# Patient Record
Sex: FEMALE | Race: WHITE | HISPANIC OR LATINO
[De-identification: names, ages, dates, MRNs, and addresses within clinical notes are randomized per-mention and may not be internally consistent; named-entity substitution may affect disease eponyms.]

---

## 2018-05-15 ENCOUNTER — APPOINTMENT (OUTPATIENT)
Dept: OTOLARYNGOLOGY | Facility: CLINIC | Age: 53
End: 2018-05-15

## 2018-07-02 ENCOUNTER — APPOINTMENT (OUTPATIENT)
Dept: OTOLARYNGOLOGY | Facility: CLINIC | Age: 53
End: 2018-07-02

## 2020-11-02 ENCOUNTER — RESULT REVIEW (OUTPATIENT)
Age: 55
End: 2020-11-02

## 2023-11-16 ENCOUNTER — APPOINTMENT (OUTPATIENT)
Dept: UROGYNECOLOGY | Facility: CLINIC | Age: 58
End: 2023-11-16
Payer: MEDICAID

## 2023-11-16 DIAGNOSIS — Z82.5 FAMILY HISTORY OF ASTHMA AND OTHER CHRONIC LOWER RESPIRATORY DISEASES: ICD-10-CM

## 2023-11-16 DIAGNOSIS — Z83.79 FAMILY HISTORY OF OTHER DISEASES OF THE DIGESTIVE SYSTEM: ICD-10-CM

## 2023-11-16 DIAGNOSIS — Z82.49 FAMILY HISTORY OF ISCHEMIC HEART DISEASE AND OTHER DISEASES OF THE CIRCULATORY SYSTEM: ICD-10-CM

## 2023-11-16 DIAGNOSIS — N39.46 MIXED INCONTINENCE: ICD-10-CM

## 2023-11-16 DIAGNOSIS — Z78.9 OTHER SPECIFIED HEALTH STATUS: ICD-10-CM

## 2023-11-16 DIAGNOSIS — N32.81 OVERACTIVE BLADDER: ICD-10-CM

## 2023-11-16 DIAGNOSIS — Z80.3 FAMILY HISTORY OF MALIGNANT NEOPLASM OF BREAST: ICD-10-CM

## 2023-11-16 DIAGNOSIS — K59.09 OTHER CONSTIPATION: ICD-10-CM

## 2023-11-16 DIAGNOSIS — Z83.42 FAMILY HISTORY OF FAMILIAL HYPERCHOLESTEROLEMIA: ICD-10-CM

## 2023-11-16 LAB
BILIRUB UR QL STRIP: NORMAL
CLARITY UR: CLEAR
COLLECTION METHOD: NORMAL
GLUCOSE UR-MCNC: NORMAL
HCG UR QL: 0.2 EU/DL
HGB UR QL STRIP.AUTO: NORMAL
KETONES UR-MCNC: ABNORMAL
LEUKOCYTE ESTERASE UR QL STRIP: NORMAL
NITRITE UR QL STRIP: NORMAL
PH UR STRIP: 7
PROT UR STRIP-MCNC: NORMAL
SP GR UR STRIP: 1.02

## 2023-11-16 PROCEDURE — 51701 INSERT BLADDER CATHETER: CPT

## 2023-11-16 PROCEDURE — 99205 OFFICE O/P NEW HI 60 MIN: CPT | Mod: 25

## 2023-11-17 PROBLEM — Z00.00 ENCOUNTER FOR PREVENTIVE HEALTH EXAMINATION: Status: ACTIVE | Noted: 2023-11-17

## 2023-11-17 PROBLEM — Z78.9 NON-SMOKER: Status: ACTIVE | Noted: 2023-11-17

## 2023-11-17 PROBLEM — Z82.5 FAMILY HISTORY OF ASTHMA: Status: ACTIVE | Noted: 2023-11-17

## 2023-11-17 PROBLEM — Z83.79 FAMILY HISTORY OF LIVER DISEASE: Status: ACTIVE | Noted: 2023-11-17

## 2023-11-17 PROBLEM — Z82.49 FAMILY HISTORY OF HYPERTENSION: Status: ACTIVE | Noted: 2023-11-17

## 2023-11-17 PROBLEM — Z83.42 FAMILY HISTORY OF HYPERCHOLESTEROLEMIA: Status: ACTIVE | Noted: 2023-11-17

## 2023-11-17 PROBLEM — Z80.3 FAMILY HISTORY OF MALIGNANT NEOPLASM OF BREAST: Status: ACTIVE | Noted: 2023-11-17

## 2023-11-22 ENCOUNTER — APPOINTMENT (OUTPATIENT)
Dept: ULTRASOUND IMAGING | Facility: CLINIC | Age: 58
End: 2023-11-22
Payer: MEDICAID

## 2023-11-22 ENCOUNTER — OUTPATIENT (OUTPATIENT)
Dept: OUTPATIENT SERVICES | Facility: HOSPITAL | Age: 58
LOS: 1 days | End: 2023-11-22
Payer: MEDICAID

## 2023-11-22 ENCOUNTER — RESULT REVIEW (OUTPATIENT)
Age: 58
End: 2023-11-22

## 2023-11-22 DIAGNOSIS — Z00.8 ENCOUNTER FOR OTHER GENERAL EXAMINATION: ICD-10-CM

## 2023-11-22 DIAGNOSIS — N81.2 INCOMPLETE UTEROVAGINAL PROLAPSE: ICD-10-CM

## 2023-11-22 PROCEDURE — 76830 TRANSVAGINAL US NON-OB: CPT | Mod: 26

## 2023-11-22 PROCEDURE — 76856 US EXAM PELVIC COMPLETE: CPT | Mod: 26

## 2023-11-22 PROCEDURE — 76830 TRANSVAGINAL US NON-OB: CPT

## 2023-11-22 PROCEDURE — 76856 US EXAM PELVIC COMPLETE: CPT

## 2024-01-10 ENCOUNTER — OUTPATIENT (OUTPATIENT)
Dept: OUTPATIENT SERVICES | Facility: HOSPITAL | Age: 59
LOS: 1 days | End: 2024-01-10
Payer: MEDICAID

## 2024-01-10 ENCOUNTER — APPOINTMENT (OUTPATIENT)
Dept: UROGYNECOLOGY | Facility: CLINIC | Age: 59
End: 2024-01-10
Payer: MEDICAID

## 2024-01-10 DIAGNOSIS — R93.89 ABNORMAL FINDINGS ON DIAGNOSTIC IMAGING OF OTHER SPECIFIED BODY STRUCTURES: ICD-10-CM

## 2024-01-10 DIAGNOSIS — Z01.818 ENCOUNTER FOR OTHER PREPROCEDURAL EXAMINATION: ICD-10-CM

## 2024-01-10 PROCEDURE — 58100 BIOPSY OF UTERUS LINING: CPT

## 2024-02-01 ENCOUNTER — APPOINTMENT (OUTPATIENT)
Dept: UROGYNECOLOGY | Facility: CLINIC | Age: 59
End: 2024-02-01
Payer: MEDICAID

## 2024-02-01 ENCOUNTER — NON-APPOINTMENT (OUTPATIENT)
Age: 59
End: 2024-02-01

## 2024-02-01 ENCOUNTER — RESULT CHARGE (OUTPATIENT)
Age: 59
End: 2024-02-01

## 2024-02-01 ENCOUNTER — OUTPATIENT (OUTPATIENT)
Dept: OUTPATIENT SERVICES | Facility: HOSPITAL | Age: 59
LOS: 1 days | End: 2024-02-01
Payer: MEDICAID

## 2024-02-01 DIAGNOSIS — N32.81 OVERACTIVE BLADDER: ICD-10-CM

## 2024-02-01 DIAGNOSIS — Z01.818 ENCOUNTER FOR OTHER PREPROCEDURAL EXAMINATION: ICD-10-CM

## 2024-02-01 LAB
BILIRUB UR QL STRIP: NEGATIVE
CLARITY UR: CLEAR
COLLECTION METHOD: NORMAL
GLUCOSE UR-MCNC: NEGATIVE
HCG UR QL: 0.2 EU/DL
HGB UR QL STRIP.AUTO: NEGATIVE
KETONES UR-MCNC: NEGATIVE
LEUKOCYTE ESTERASE UR QL STRIP: NEGATIVE
NITRITE UR QL STRIP: NEGATIVE
PH UR STRIP: 7
PROT UR STRIP-MCNC: NEGATIVE
SP GR UR STRIP: 1.02

## 2024-02-01 PROCEDURE — 51784 ANAL/URINARY MUSCLE STUDY: CPT | Mod: 26

## 2024-02-01 PROCEDURE — 51729 CYSTOMETROGRAM W/VP&UP: CPT | Mod: 26

## 2024-02-01 PROCEDURE — 51797 INTRAABDOMINAL PRESSURE TEST: CPT

## 2024-02-01 PROCEDURE — 51784 ANAL/URINARY MUSCLE STUDY: CPT

## 2024-02-01 PROCEDURE — 81003 URINALYSIS AUTO W/O SCOPE: CPT | Mod: QW

## 2024-02-01 PROCEDURE — 51729 CYSTOMETROGRAM W/VP&UP: CPT

## 2024-02-01 PROCEDURE — 51797 INTRAABDOMINAL PRESSURE TEST: CPT | Mod: 26

## 2024-02-08 ENCOUNTER — APPOINTMENT (OUTPATIENT)
Dept: UROGYNECOLOGY | Facility: CLINIC | Age: 59
End: 2024-02-08
Payer: MEDICAID

## 2024-02-08 VITALS
HEART RATE: 80 BPM | BODY MASS INDEX: 25.49 KG/M2 | HEIGHT: 61 IN | WEIGHT: 135 LBS | SYSTOLIC BLOOD PRESSURE: 125 MMHG | DIASTOLIC BLOOD PRESSURE: 78 MMHG

## 2024-02-08 PROCEDURE — 99215 OFFICE O/P EST HI 40 MIN: CPT

## 2024-02-10 NOTE — DISCUSSION/SUMMARY
[Visit Time ___ Minutes] : [unfilled] minutes [FreeTextEntry1] : We reviewed the surgical options including vaginal apical suspension, abdominal approach (robotic or laparoscopic sacrocolpopexy). She is sexually active and desires to maintain that ability. She also desires procedure with the lowest rate of recurrence. We discussed that over time, there may be recurrence regardless of the mode of surgery. We reviewed endometrial pathology report showing insufficient tissue for evaluation. Patient has no history of postmenopausal bleeding. We discussed possibly obtaining tissue sampling with D&C intraoperatively for frozen. She desires abdominal approach via robotic approach with supracervical hysterectomy and sacrocolpopexy with Y-shaped mesh. We discussed risks of mesh erosion/exposure. We discussed the need for continued cervical cancer screening with supracervical hysterectomy. We discussed the risk reduction of serous epithelial ovarian cancer with salpingectomy and oophorectomy. The patient is greater than 10 years postmenopausal and desires to proceed with salpingo-oophorectomy at the time of hysterectomy.   We reviewed the urodynamic test results. Patient has pre-existing mixed urinary incontinence, which was confirmed on evaluation. We reviewed management of stress urinary incontinence and options including bulking and sling. We discussed how management of EDWIN may worsen urgency symptoms and urgency urinary incontinence. Discussed subsequent management of overactivity postoperatively. Patient is interested in concurrent treatment of EDWIN at the time of prolapse repair with a midurethal sling with mesh. We reviewed the risk of urinary retention and mesh erosion associated. Patient would like to proceed with: possible D&C, robotic-assisted supracervical hysterectomy, BSO, sacrocolpopexy, midurethral sling, cystoscopy.

## 2024-02-10 NOTE — HISTORY OF PRESENT ILLNESS
[FreeTextEntry1] : Carola presents for follow-up of POP accompanied by her . She is s/p endometrial biopsy for thickened endometrial stripe - no h/o postmenopausal bleeding. Pathology with insufficient tissue for adequate evaluation.  She also underwent UDS notable for both EDWIN and DI with leak at 200 ml, shelter 300 ml.

## 2024-02-13 ENCOUNTER — OUTPATIENT (OUTPATIENT)
Dept: OUTPATIENT SERVICES | Facility: HOSPITAL | Age: 59
LOS: 1 days | End: 2024-02-13
Payer: MEDICAID

## 2024-02-13 VITALS
RESPIRATION RATE: 14 BRPM | SYSTOLIC BLOOD PRESSURE: 132 MMHG | OXYGEN SATURATION: 100 % | HEIGHT: 61 IN | DIASTOLIC BLOOD PRESSURE: 78 MMHG | TEMPERATURE: 98 F | HEART RATE: 77 BPM | WEIGHT: 138.89 LBS

## 2024-02-13 DIAGNOSIS — Z90.49 ACQUIRED ABSENCE OF OTHER SPECIFIED PARTS OF DIGESTIVE TRACT: Chronic | ICD-10-CM

## 2024-02-13 DIAGNOSIS — Z98.890 OTHER SPECIFIED POSTPROCEDURAL STATES: Chronic | ICD-10-CM

## 2024-02-13 DIAGNOSIS — N81.2 INCOMPLETE UTEROVAGINAL PROLAPSE: ICD-10-CM

## 2024-02-13 DIAGNOSIS — N81.11 CYSTOCELE, MIDLINE: ICD-10-CM

## 2024-02-13 DIAGNOSIS — N81.6 RECTOCELE: ICD-10-CM

## 2024-02-13 DIAGNOSIS — N39.3 STRESS INCONTINENCE (FEMALE) (MALE): ICD-10-CM

## 2024-02-13 DIAGNOSIS — Z01.818 ENCOUNTER FOR OTHER PREPROCEDURAL EXAMINATION: ICD-10-CM

## 2024-02-13 DIAGNOSIS — Z29.9 ENCOUNTER FOR PROPHYLACTIC MEASURES, UNSPECIFIED: ICD-10-CM

## 2024-02-13 LAB
A1C WITH ESTIMATED AVERAGE GLUCOSE RESULT: 5.8 % — HIGH (ref 4–5.6)
ANION GAP SERPL CALC-SCNC: 10 MMOL/L — SIGNIFICANT CHANGE UP (ref 5–17)
BLD GP AB SCN SERPL QL: NEGATIVE — SIGNIFICANT CHANGE UP
BUN SERPL-MCNC: 12 MG/DL — SIGNIFICANT CHANGE UP (ref 7–23)
CALCIUM SERPL-MCNC: 9.3 MG/DL — SIGNIFICANT CHANGE UP (ref 8.4–10.5)
CHLORIDE SERPL-SCNC: 106 MMOL/L — SIGNIFICANT CHANGE UP (ref 96–108)
CO2 SERPL-SCNC: 25 MMOL/L — SIGNIFICANT CHANGE UP (ref 22–31)
CREAT SERPL-MCNC: 0.54 MG/DL — SIGNIFICANT CHANGE UP (ref 0.5–1.3)
EGFR: 107 ML/MIN/1.73M2 — SIGNIFICANT CHANGE UP
ESTIMATED AVERAGE GLUCOSE: 120 MG/DL — HIGH (ref 68–114)
GLUCOSE SERPL-MCNC: 105 MG/DL — HIGH (ref 70–99)
HCT VFR BLD CALC: 37.3 % — SIGNIFICANT CHANGE UP (ref 34.5–45)
HGB BLD-MCNC: 11.9 G/DL — SIGNIFICANT CHANGE UP (ref 11.5–15.5)
MCHC RBC-ENTMCNC: 27.8 PG — SIGNIFICANT CHANGE UP (ref 27–34)
MCHC RBC-ENTMCNC: 31.9 GM/DL — LOW (ref 32–36)
MCV RBC AUTO: 87.1 FL — SIGNIFICANT CHANGE UP (ref 80–100)
NRBC # BLD: 0 /100 WBCS — SIGNIFICANT CHANGE UP (ref 0–0)
PLATELET # BLD AUTO: 342 K/UL — SIGNIFICANT CHANGE UP (ref 150–400)
POTASSIUM SERPL-MCNC: 4.1 MMOL/L — SIGNIFICANT CHANGE UP (ref 3.5–5.3)
POTASSIUM SERPL-SCNC: 4.1 MMOL/L — SIGNIFICANT CHANGE UP (ref 3.5–5.3)
RBC # BLD: 4.28 M/UL — SIGNIFICANT CHANGE UP (ref 3.8–5.2)
RBC # FLD: 13.2 % — SIGNIFICANT CHANGE UP (ref 10.3–14.5)
RH IG SCN BLD-IMP: POSITIVE — SIGNIFICANT CHANGE UP
SODIUM SERPL-SCNC: 141 MMOL/L — SIGNIFICANT CHANGE UP (ref 135–145)
WBC # BLD: 5.7 K/UL — SIGNIFICANT CHANGE UP (ref 3.8–10.5)
WBC # FLD AUTO: 5.7 K/UL — SIGNIFICANT CHANGE UP (ref 3.8–10.5)

## 2024-02-13 PROCEDURE — 86901 BLOOD TYPING SEROLOGIC RH(D): CPT

## 2024-02-13 PROCEDURE — 86850 RBC ANTIBODY SCREEN: CPT

## 2024-02-13 PROCEDURE — 85027 COMPLETE CBC AUTOMATED: CPT

## 2024-02-13 PROCEDURE — 36415 COLL VENOUS BLD VENIPUNCTURE: CPT

## 2024-02-13 PROCEDURE — 83036 HEMOGLOBIN GLYCOSYLATED A1C: CPT

## 2024-02-13 PROCEDURE — G0463: CPT

## 2024-02-13 PROCEDURE — 80048 BASIC METABOLIC PNL TOTAL CA: CPT

## 2024-02-13 PROCEDURE — 86900 BLOOD TYPING SEROLOGIC ABO: CPT

## 2024-02-13 RX ORDER — LIDOCAINE HCL 20 MG/ML
0.2 VIAL (ML) INJECTION ONCE
Refills: 0 | Status: DISCONTINUED | OUTPATIENT
Start: 2024-02-29 | End: 2024-02-29

## 2024-02-13 RX ORDER — CELECOXIB 200 MG/1
400 CAPSULE ORAL ONCE
Refills: 0 | Status: COMPLETED | OUTPATIENT
Start: 2024-02-29 | End: 2024-02-29

## 2024-02-13 RX ORDER — SODIUM CHLORIDE 9 MG/ML
3 INJECTION INTRAMUSCULAR; INTRAVENOUS; SUBCUTANEOUS EVERY 8 HOURS
Refills: 0 | Status: DISCONTINUED | OUTPATIENT
Start: 2024-02-29 | End: 2024-02-29

## 2024-02-13 RX ORDER — GABAPENTIN 400 MG/1
600 CAPSULE ORAL ONCE
Refills: 0 | Status: COMPLETED | OUTPATIENT
Start: 2024-02-29 | End: 2024-02-29

## 2024-02-13 RX ORDER — ACETAMINOPHEN 500 MG
1000 TABLET ORAL ONCE
Refills: 0 | Status: COMPLETED | OUTPATIENT
Start: 2024-02-29 | End: 2024-02-29

## 2024-02-13 RX ORDER — CEFOTETAN DISODIUM 1 G
2 VIAL (EA) INJECTION ONCE
Refills: 0 | Status: DISCONTINUED | OUTPATIENT
Start: 2024-02-29 | End: 2024-03-01

## 2024-02-13 RX ORDER — CHLORHEXIDINE GLUCONATE 213 G/1000ML
1 SOLUTION TOPICAL ONCE
Refills: 0 | Status: DISCONTINUED | OUTPATIENT
Start: 2024-02-29 | End: 2024-02-29

## 2024-02-13 NOTE — H&P PST ADULT - ASSESSMENT
denies loose, shaky, or removable teeth    CAPRINI VTE 2.0 SCORE [CLOT updated 2019]    AGE RELATED RISK FACTORS                                                       MOBILITY RELATED FACTORS  [x] Age 41-60 years                                            (1 Point)                    [ ] Bed rest                                                        (1 Point)  [ ] Age: 61-74 years                                           (2 Points)                  [ ] Plaster cast                                                   (2 Points)  [ ] Age= 75 years                                              (3 Points)                    [ ] Bed bound for more than 72 hours                 (2 Points)    DISEASE RELATED RISK FACTORS                                               GENDER SPECIFIC FACTORS  [ ] Edema in the lower extremities                       (1 Point)              [ ] Pregnancy                                                     (1 Point)  [ ] Varicose veins                                               (1 Point)                     [ ] Post-partum < 6 weeks                                   (1 Point)             [x] BMI > 25 Kg/m2                                            (1 Point)                     [ ] Hormonal therapy  or oral contraception          (1 Point)                 [ ] Sepsis (in the previous month)                        (1 Point)               [ ] History of pregnancy complications                 (1 point)  [ ] Pneumonia or serious lung disease                                               [ ] Unexplained or recurrent                     (1 Point)           (in the previous month)                               (1 Point)  [ ] Abnormal pulmonary function test                     (1 Point)                 SURGERY RELATED RISK FACTORS  [ ] Acute myocardial infarction                              (1 Point)               [ ]  Section                                             (1 Point)  [ ] Congestive heart failure (in the previous month)  (1 Point)      [ ] Minor surgery                                                  (1 Point)   [ ] Inflammatory bowel disease                             (1 Point)               [ ] Arthroscopic surgery                                        (2 Points)  [ ] Central venous access                                      (2 Points)                [x ] General surgery lasting more than 45 minutes (2 points)  [ ] Malignancy- Present or previous                   (2 Points)                [ ] Elective arthroplasty                                         (5 points)    [ ] Stroke (in the previous month)                          (5 Points)                                                                                                                                                           HEMATOLOGY RELATED FACTORS                                                 TRAUMA RELATED RISK FACTORS  [ ] Prior episodes of VTE                                     (3 Points)                [ ] Fracture of the hip, pelvis, or leg                       (5 Points)  [ ] Positive family history for VTE                         (3 Points)             [ ] Acute spinal cord injury (in the previous month)  (5 Points)  [ ] Prothrombin 62931 A                                     (3 Points)               [ ] Paralysis  (less than 1 month)                             (5 Points)  [ ] Factor V Leiden                                             (3 Points)                  [ ] Multiple Trauma within 1 month                        (5 Points)  [ ] Lupus anticoagulants                                     (3 Points)                                                           [ ] Anticardiolipin antibodies                               (3 Points)                                                       [ ] High homocysteine in the blood                      (3 Points)                                             [ ] Other congenital or acquired thrombophilia      (3 Points)                                                [ ] Heparin induced thrombocytopenia                  (3 Points)                                     Total Score [ 3 ]

## 2024-02-13 NOTE — H&P PST ADULT - PROBLEM SELECTOR PLAN 1
Cystocele Midline  Procedure: D&C, Laparoscopic Robotic Supracervical Hysterectomy, Laparoscopic Robotic Sacral Colopexy, Midurethral Sling, Cystoscopy on 2/29/2024  -labs as per PST protocol  -medical evaluation  -preop instructions and surgical soap along with instructions provided. teach back performed  -ABO, fingerstick on admit  -ERP protocol

## 2024-02-13 NOTE — H&P PST ADULT - HISTORY OF PRESENT ILLNESS
59 year old postmenopausal female  who presents with complaint of Pelvic Organ Prolapse. Patient reports that her symptoms started approximately 2 months ago, states she often that to splint in order to empty her bladder fully. She reports urinary urgency, frequency, occasional urgency incontinence episodes as well as leakage with strenuous activity, laughing or coughing.  Reports she underwent UDS notable for EDWIN. She denies fever, chills, nausea, vomiting, abdominal pain, gross hematuria, dysuria, abnormal vaginal bleeding/discharge, changes in bowel habits or changes in weight. Patient presents to PST today or evaluation prior to scheduled D&C, Laparoscopic Robotic Supracervical Hysterectomy, Laparoscopic Robotic Sacral Colopexy, Midurethral Sling, Cystoscopy on 2024.

## 2024-02-13 NOTE — H&P PST ADULT - NSICDXPASTSURGICALHX_GEN_ALL_CORE_FT
PAST SURGICAL HISTORY:  H/O colonoscopy     History of endoscopy     S/P cholecystectomy     S/P ERCP

## 2024-02-13 NOTE — H&P PST ADULT - RESPIRATORY
clear to auscultation bilaterally/no wheezes/no use of accessory muscles/no subcutaneous emphysema/respirations non-labored

## 2024-02-13 NOTE — H&P PST ADULT - NEGATIVE GENERAL GENITOURINARY SYMPTOMS
no hematuria/no flank pain L/no flank pain R What Type Of Note Output Would You Prefer (Optional)?: Bullet Format Hpi Title: Evaluation of Skin Lesions How Severe Are Your Spot(S)?: mild Have Your Spot(S) Been Treated In The Past?: has not been treated Additional History: FBE\\nSpots on arm and ear

## 2024-02-13 NOTE — H&P PST ADULT - NSANTHOSAYNRD_GEN_A_CORE
No. [place] screening performed.  STOP BANG Legend: 0-2 = LOW Risk; 3-4 = INTERMEDIATE Risk; 5-8 = HIGH Risk

## 2024-02-13 NOTE — H&P PST ADULT - FUNCTIONAL STATUS
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

## 2024-02-13 NOTE — H&P PST ADULT - NSICDXPASTMEDICALHX_GEN_ALL_CORE_FT
PAST MEDICAL HISTORY:  Choledocholithiasis     Colonic polyp     Environmental allergies     H/O cholelithiasis

## 2024-02-14 ENCOUNTER — APPOINTMENT (OUTPATIENT)
Dept: UROGYNECOLOGY | Facility: CLINIC | Age: 59
End: 2024-02-14
Payer: MEDICAID

## 2024-02-14 PROCEDURE — ZZZZZ: CPT

## 2024-02-14 NOTE — PROCEDURE
[Straight Catheterization] : insertion of a straight catheter [Urinary Tract Infection] : a urinary tract infection [Patient] : the patient [Allergies Reviewed] : Allergies reviewed [None] : none [___ Fr Straight Tip] : a [unfilled] in [place] straight tip catheter [Clear] : clear [Culture] : culture [No Complications] : no complications [Tolerated Well] : the patient tolerated the procedure well [Post procedure instructions and information given] : Post procedure instructions and information were given and reviewed with patient. [0] : 0

## 2024-02-15 ENCOUNTER — NON-APPOINTMENT (OUTPATIENT)
Age: 59
End: 2024-02-15

## 2024-02-15 LAB — BACTERIA UR CULT: NORMAL

## 2024-02-27 ENCOUNTER — APPOINTMENT (OUTPATIENT)
Dept: UROGYNECOLOGY | Facility: CLINIC | Age: 59
End: 2024-02-27
Payer: MEDICAID

## 2024-02-27 DIAGNOSIS — N81.6 RECTOCELE: ICD-10-CM

## 2024-02-27 DIAGNOSIS — N81.11 CYSTOCELE, MIDLINE: ICD-10-CM

## 2024-02-27 DIAGNOSIS — N39.3 STRESS INCONTINENCE (FEMALE) (MALE): ICD-10-CM

## 2024-02-27 DIAGNOSIS — N81.2 INCOMPLETE UTEROVAGINAL PROLAPSE: ICD-10-CM

## 2024-02-27 PROCEDURE — 99215 OFFICE O/P EST HI 40 MIN: CPT

## 2024-02-27 NOTE — HISTORY OF PRESENT ILLNESS
[FreeTextEntry1] : Carola presents for follow-up of POP. She is interested in surgical intervention and is scheduled for procedure with Dr. Hoffman on 02/29/24.  Today she presents for urine check in preparation for the procedure.  Pt denies any s/s of UTI such as burning with urination, itching, blood in urine, cloudy or foul-smelling urine.  Pt denies any acute complaints today.

## 2024-02-27 NOTE — PHYSICAL EXAM
[No Acute Distress] : in no acute distress [Well developed] : well developed [Well Nourished] : ~L well nourished [Good Hygeine] : demonstrates good hygeine [Oriented x3] : oriented to person, place, and time [Normal Memory] : ~T memory was ~L unimpaired [Normal Mood/Affect] : mood and affect are normal [Warm and Dry] : was warm and dry to touch [Normal Gait] : gait was normal [Anxiety] : patient is not anxious

## 2024-02-27 NOTE — DISCUSSION/SUMMARY
Visit Time ___ Minutes] : [unfilled] minutes [FreeTextEntry1] : #POP - Pt interested in surgical correction.  Straight cathed for clear, yellow urine; will send out for UCx and follow up.    RTO 02/27/24 for follow-up.  All questions answered to pt satisfaction.  Pt know to call the office for any additional questions or concerns.

## 2024-02-28 ENCOUNTER — TRANSCRIPTION ENCOUNTER (OUTPATIENT)
Age: 59
End: 2024-02-28

## 2024-02-29 ENCOUNTER — OUTPATIENT (OUTPATIENT)
Dept: INPATIENT UNIT | Facility: HOSPITAL | Age: 59
LOS: 1 days | End: 2024-02-29
Payer: MEDICAID

## 2024-02-29 ENCOUNTER — APPOINTMENT (OUTPATIENT)
Dept: UROGYNECOLOGY | Facility: HOSPITAL | Age: 59
End: 2024-02-29
Payer: MEDICAID

## 2024-02-29 ENCOUNTER — RESULT REVIEW (OUTPATIENT)
Age: 59
End: 2024-02-29

## 2024-02-29 VITALS
RESPIRATION RATE: 18 BRPM | SYSTOLIC BLOOD PRESSURE: 134 MMHG | OXYGEN SATURATION: 99 % | TEMPERATURE: 98 F | HEIGHT: 61 IN | DIASTOLIC BLOOD PRESSURE: 83 MMHG | WEIGHT: 138.89 LBS | HEART RATE: 69 BPM

## 2024-02-29 DIAGNOSIS — N81.11 CYSTOCELE, MIDLINE: ICD-10-CM

## 2024-02-29 DIAGNOSIS — N81.6 RECTOCELE: ICD-10-CM

## 2024-02-29 DIAGNOSIS — Z90.49 ACQUIRED ABSENCE OF OTHER SPECIFIED PARTS OF DIGESTIVE TRACT: Chronic | ICD-10-CM

## 2024-02-29 DIAGNOSIS — Z98.890 OTHER SPECIFIED POSTPROCEDURAL STATES: Chronic | ICD-10-CM

## 2024-02-29 LAB — GLUCOSE BLDC GLUCOMTR-MCNC: 91 MG/DL — SIGNIFICANT CHANGE UP (ref 70–99)

## 2024-02-29 PROCEDURE — 57425 LAPAROSCOPY SURG COLPOPEXY: CPT

## 2024-02-29 PROCEDURE — S2900 ROBOTIC SURGICAL SYSTEM: CPT

## 2024-02-29 PROCEDURE — 88305 TISSUE EXAM BY PATHOLOGIST: CPT | Mod: 26

## 2024-02-29 PROCEDURE — 58542 LSH W/T/O UT 250 G OR LESS: CPT

## 2024-02-29 PROCEDURE — 88307 TISSUE EXAM BY PATHOLOGIST: CPT | Mod: 26

## 2024-02-29 PROCEDURE — 57288 REPAIR BLADDER DEFECT: CPT

## 2024-02-29 DEVICE — SURGICEL POWDER 3 GRAMS: Type: IMPLANTABLE DEVICE | Status: FUNCTIONAL

## 2024-02-29 DEVICE — SYS SLING MID URET TRANSVAG: Type: IMPLANTABLE DEVICE | Status: FUNCTIONAL

## 2024-02-29 DEVICE — MESH UPSYLON Y: Type: IMPLANTABLE DEVICE | Status: FUNCTIONAL

## 2024-02-29 RX ORDER — POLYETHYLENE GLYCOL 3350 17 G/17G
17 POWDER, FOR SOLUTION ORAL
Qty: 1 | Refills: 0
Start: 2024-02-29 | End: 2024-03-29

## 2024-02-29 RX ORDER — ONDANSETRON 8 MG/1
4 TABLET, FILM COATED ORAL ONCE
Refills: 0 | Status: DISCONTINUED | OUTPATIENT
Start: 2024-02-29 | End: 2024-03-01

## 2024-02-29 RX ORDER — LORATADINE 10 MG/1
1 TABLET ORAL
Refills: 0 | DISCHARGE

## 2024-02-29 RX ORDER — SODIUM CHLORIDE 9 MG/ML
1000 INJECTION, SOLUTION INTRAVENOUS
Refills: 0 | Status: DISCONTINUED | OUTPATIENT
Start: 2024-02-29 | End: 2024-03-01

## 2024-02-29 RX ORDER — IBUPROFEN 200 MG
1 TABLET ORAL
Qty: 0 | Refills: 0 | DISCHARGE
Start: 2024-02-29

## 2024-02-29 RX ORDER — IBUPROFEN 200 MG
600 TABLET ORAL EVERY 6 HOURS
Refills: 0 | Status: DISCONTINUED | OUTPATIENT
Start: 2024-02-29 | End: 2024-03-01

## 2024-02-29 RX ORDER — HYDROMORPHONE HYDROCHLORIDE 2 MG/ML
0.5 INJECTION INTRAMUSCULAR; INTRAVENOUS; SUBCUTANEOUS
Refills: 0 | Status: DISCONTINUED | OUTPATIENT
Start: 2024-02-29 | End: 2024-03-01

## 2024-02-29 RX ORDER — ACETAMINOPHEN 500 MG
975 TABLET ORAL EVERY 6 HOURS
Refills: 0 | Status: DISCONTINUED | OUTPATIENT
Start: 2024-02-29 | End: 2024-03-01

## 2024-02-29 RX ORDER — ACETAMINOPHEN 500 MG
3 TABLET ORAL
Qty: 0 | Refills: 0 | DISCHARGE
Start: 2024-02-29

## 2024-02-29 RX ADMIN — Medication 975 MILLIGRAM(S): at 21:13

## 2024-02-29 RX ADMIN — Medication 975 MILLIGRAM(S): at 20:43

## 2024-02-29 RX ADMIN — CELECOXIB 400 MILLIGRAM(S): 200 CAPSULE ORAL at 12:11

## 2024-02-29 RX ADMIN — GABAPENTIN 600 MILLIGRAM(S): 400 CAPSULE ORAL at 12:11

## 2024-02-29 RX ADMIN — Medication 1000 MILLIGRAM(S): at 12:11

## 2024-02-29 RX ADMIN — HYDROMORPHONE HYDROCHLORIDE 0.5 MILLIGRAM(S): 2 INJECTION INTRAMUSCULAR; INTRAVENOUS; SUBCUTANEOUS at 17:40

## 2024-02-29 RX ADMIN — SODIUM CHLORIDE 75 MILLILITER(S): 9 INJECTION, SOLUTION INTRAVENOUS at 22:52

## 2024-02-29 RX ADMIN — HYDROMORPHONE HYDROCHLORIDE 0.5 MILLIGRAM(S): 2 INJECTION INTRAMUSCULAR; INTRAVENOUS; SUBCUTANEOUS at 19:15

## 2024-02-29 RX ADMIN — HYDROMORPHONE HYDROCHLORIDE 0.5 MILLIGRAM(S): 2 INJECTION INTRAMUSCULAR; INTRAVENOUS; SUBCUTANEOUS at 17:55

## 2024-02-29 RX ADMIN — HYDROMORPHONE HYDROCHLORIDE 0.5 MILLIGRAM(S): 2 INJECTION INTRAMUSCULAR; INTRAVENOUS; SUBCUTANEOUS at 19:30

## 2024-02-29 NOTE — BRIEF OPERATIVE NOTE - NSICDXBRIEFPOSTOP_GEN_ALL_CORE_FT
POST-OP DIAGNOSIS:  Prolapse of female pelvic organs 29-Feb-2024 17:40:14  Kathi Gill  EDWIN (stress urinary incontinence, female) 29-Feb-2024 17:40:18  Kathi Gill

## 2024-02-29 NOTE — REASON FOR VISIT
[Follow-Up Visit_____] : a follow-up visit for [unfilled] [Time Spent: ____ minutes] : Total time spent using  services: [unfilled] minutes. The patient's primary language is not English thus required  services. [Interpreters_IDNumber] : [id number] [Interpreters_FullName] : Jett [TWNoteComboBox1] : Danish

## 2024-02-29 NOTE — BRIEF OPERATIVE NOTE - SECOND ASSISTANT
Detail Level: Zone Strength: [facility] plus NG tube Post-Care Instructions: 1. Remove the tape and wash off the medication thoroughly with soap and water _______ hours after the medication was applied.\\n2.  Within 24-48 hours after treatment, a fluid-filled or blood-filled blister may form.  If the blister becomes very tense, throbbing and painful, it may be opened with a sterile needle to relieve the pressure.  D not open the blister unless it is very painful and tense.  Do not remove the skin that makes the blister as this serves as a good covering for the wound.  Tylenol or Ibuprofen is recommended for pain.\\n3.  No later than 72 hours after initial treatment, you will need to debride the wart.  This is the most important step in treating your wart effectively.  The wart should be debrided everyday until it is resolved. Total Number Of Lesions Treated: 1 Curette Before Application?: No Curette Text: Prior to application of cantharidin the lesions were lightly pared with a curette. Resident/Fellow

## 2024-02-29 NOTE — ASU DISCHARGE PLAN (ADULT/PEDIATRIC) - NS MD DC FALL RISK RISK
For information on Fall & Injury Prevention, visit: [email or web address] OR  [email or web address] OR  https://www.cdc.gov/steadi/patient.html

## 2024-02-29 NOTE — PATIENT PROFILE ADULT - OVER THE PAST TWO WEEKS HAVE YOU FELT DOWN, DEPRESSED OR HOPELESS?
-- DO NOT REPLY / DO NOT REPLY ALL --  -- Message is from the [facility]--    General Patient Message      Reason for Call:  Patient had a mammogram and was told she needed a biopsy and would like to discuss this with Dr. Ferro.    Caller Information       Type Contact Phone    10/08/2021 01:12 PM CDT Phone (Incoming) Gregorio Henry (Self) [phone] (B)          Alternative phone number: [phone] (M)    Turnaround time given to caller:   \"This message will be sent to [state Provider's name]. The clinical team will fulfill your request as soon as they review your message.\"     no

## 2024-02-29 NOTE — ASU DISCHARGE PLAN (ADULT/PEDIATRIC) - CARE PROVIDER_API CALL
Zoila Hoffman  Urogyn and Reconst Pelvic Surg  [address]  [place] [address]  Phone: [phone]  Fax: [phone]  Follow Up Time: 2 weeks

## 2024-02-29 NOTE — BRIEF OPERATIVE NOTE - OPERATION/FINDINGS
Cystoscopy demonstrating ureteral orifices emanating clear efflux b/l. Normal bladder mucosa with no evidence of injury, suture, foreign body, or tumor.    LUCERO no suture or injury

## 2024-02-29 NOTE — DISCUSSION/SUMMARY
[Visit Time ___ Minutes] : [unfilled] minutes [FreeTextEntry1] : We reviewed the surgical options including vaginal apical suspension, abdominal approach (robotic or laparoscopic sacrocolpopexy). She is sexually active and desires to maintain that ability. She also desires procedure with the lowest rate of recurrence. We discussed that over time, there may be recurrence regardless of the mode of surgery. We reviewed endometrial pathology report showing insufficient tissue for evaluation. Patient has no history of postmenopausal bleeding. We discussed possibly obtaining tissue sampling with D&C intraoperatively or sending uterus for frozen. EMS borderline and patient without history of postmenopausal bleeding, therefore very low concern for uterine pathology. She desires abdominal approach via robotic approach with supracervical hysterectomy and sacrocolpopexy with Y-shaped mesh. We discussed risks of mesh erosion/exposure. We discussed the need for continued cervical cancer screening with supracervical hysterectomy. We discussed the risk reduction of serous epithelial ovarian cancer with salpingectomy and oophorectomy. The patient is greater than 10 years postmenopausal and desires to proceed with salpingo-oophorectomy at the time of hysterectomy.   We reviewed the urodynamic test results. Patient has pre-existing mixed urinary incontinence, which was confirmed on evaluation. We reviewed management of stress urinary incontinence and options including bulking and sling. We discussed how management of EDWIN may worsen urgency symptoms and urgency urinary incontinence. Discussed subsequent management of overactivity postoperatively. Patient is interested in concurrent treatment of EDWIN at the time of prolapse repair with a midurethral sling with mesh. We reviewed the risk of urinary retention and mesh erosion associated.   Risks of procedure including but not limited to the following were discussed: Bleeding, hemorrhage, need for transfusion, infection, damage to surrounding organs (bladder, bowel, nerves, vessels), ureteral injury, pain, scar tissue formation, DVT/PE. We discussed the risk of recurrence of prolapse, urinary retention, need for discharge home with catheter, mesh erosion and possibility of needing additional surgery in the future. All questions were answered. She expressed understanding of the above and would like to proceed with the scheduled surgery. I provided pre-operative and post-operative instructions and counseling on expectations.  Consent was signed and witnessed for possible pelvic exam under anesthesia, D&C, robotic-assisted supracervical hysterectomy, bilateral salpingo-oophorectomy, sacrocolpopexy, midurethral sling with mesh, cystoscopy, all other indicated procedures, possible laparotomy.

## 2024-02-29 NOTE — PHYSICAL EXAM
[No Acute Distress] : in no acute distress [Well developed] : well developed [Well Nourished] : ~L well nourished [Oriented x3] : oriented to person, place, and time [Normal Memory] : ~T memory was ~L unimpaired [Normal Mood/Affect] : mood and affect are normal [Respirations regular] : ~T respiratory rate was regular [No Edema] : ~T edema was not present [Warm and Dry] : was warm and dry to touch [Normal Appearance] : general appearance was normal [Atrophy] : atrophy [Aa ____] : Aa [unfilled] [Ba ____] : Ba [unfilled] [C ____] : C [unfilled] [GH ____] : GH [unfilled] [PB ____] : PB [unfilled] [TVL ____] : TVL  [unfilled] [Ap ____] : Ap [unfilled] [Bp ____] : Bp [unfilled] [D ____] : D [unfilled] [Normal] : normal [Anxiety] : patient is not anxious [Mass (___ Cm)] : no ~M [unfilled] abdominal mass was palpated [Distended] : not distended [Tenderness] : ~T no ~M abdominal tenderness observed

## 2024-02-29 NOTE — HISTORY OF PRESENT ILLNESS
[FreeTextEntry1] : Carola presents for preoperative visit for management of prolapse and EDWIN. No h/o postmenopausal bleeding.   [x] Pelvic US: 6.0 x 2.8 x 3.9 cm, EMS 5 mm with small cystic foci, normal ovaries b/l [x] EMBx: insufficient tissue for adequate evaluation [x] UDS: EDWIN and DI with leak at 200 ml, halfway 300 ml. [x] Last pap 2023 - results viewed on pt's [facility] portal - NILM, HPV neg (patient to request record sent by GYN's office) [x] Urine culture  - negative. [x] Medical clearance reviewed.  PMH: denies. PSH: cholecystectomy OBGYN Hx:  x 1; report normal pap smear; last GYN annual was within the last 3 months. 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

## 2024-02-29 NOTE — BRIEF OPERATIVE NOTE - NSICDXBRIEFPROCEDURE_GEN_ALL_CORE_FT
PROCEDURES:  Hysterectomy, supracervical, laparoscopic, with BSO and cystoscopy 29-Feb-2024 17:38:44  Kathi Gill  Robot-assisted sacrocolpopexy 29-Feb-2024 17:38:58  Kathi Gill  Bilateral salpingoophorectomy 29-Feb-2024 17:39:08  Kathi Gill

## 2024-02-29 NOTE — BRIEF OPERATIVE NOTE - NSICDXBRIEFPREOP_GEN_ALL_CORE_FT
PRE-OP DIAGNOSIS:  Prolapse of female pelvic organs 29-Feb-2024 17:39:28  Kathi Gill  EWDIN (stress urinary incontinence, female) 29-Feb-2024 17:39:35  Kathi Gill

## 2024-02-29 NOTE — ASU DISCHARGE PLAN (ADULT/PEDIATRIC) - ASU DC SPECIAL INSTRUCTIONSFT
Bowel regimen  To avoid constipation and straining, we suggest the following (simultaneously):  1. Colace (stool softener) 100mg, one pill three times a day (breakfast, lunch, dinner). If stool is too soft, decrease to twice a day (breakfast, dinner).   2. If still constipated, you can add: Miralax once at bedtime. Miralax was sent to your pharmacy  All of these agents can be obtained over the counter at the pharmacy.    Pain control  For pain control, take the followin. Motrin 800mg three times a day, or 600mg four times a day, take with food  2. Add Tylenol as needed if still have pain despite Motrin   Motrin and Tylenol can be obtained over the counter.  Postoperative urinary catheter  If you failed your voiding trial in the hospital and are sent home with a catheter, please call the office ([phone]) so you can come in to have a repeat voiding trial/catheter removal in a few days.    Postoperative restrictions  Nothing in the vagina (tampons, sexual intercourse), No tub baths, pools or hot tubs for 6 weeks (showers are ok!). No lifting anything heavier than 15 lbs, no strenuous exercise for 6 weeks after surgery. Do not pull or cut any stitches that you see around your incision.  Vaginal bleeding  Spotting and intermittent passage of blood clots per vagina is normal in first few weeks after surgery. If you are soaking 1 pad per hour, that is not normal and you should notify my office and seek medical attention right away.  Vaginal discharge  Vaginal discharge (all colors) is normal after vaginal surgery. If you’ve had vaginal surgery, you have sutures in your vagina which take 3 months to fully absorb. You may have vaginal discharge during this time. This is normal.

## 2024-03-01 ENCOUNTER — TRANSCRIPTION ENCOUNTER (OUTPATIENT)
Age: 59
End: 2024-03-01

## 2024-03-01 VITALS
OXYGEN SATURATION: 100 % | RESPIRATION RATE: 18 BRPM | HEART RATE: 89 BPM | DIASTOLIC BLOOD PRESSURE: 64 MMHG | SYSTOLIC BLOOD PRESSURE: 129 MMHG

## 2024-03-01 RX ADMIN — Medication 975 MILLIGRAM(S): at 07:35

## 2024-03-01 RX ADMIN — Medication 975 MILLIGRAM(S): at 07:12

## 2024-03-01 RX ADMIN — Medication 600 MILLIGRAM(S): at 02:58

## 2024-03-01 RX ADMIN — Medication 600 MILLIGRAM(S): at 02:52

## 2024-03-01 NOTE — DISCHARGE NOTE PROVIDER - CARE PROVIDER_API CALL
Zoila Hoffman  Urogyn and Reconst Pelvic Surg  [address]  [place] [address]  Phone: [phone]  Fax: [phone]  Established Patient  Follow Up Time: 2 weeks

## 2024-03-01 NOTE — DISCHARGE NOTE NURSING/CASE MANAGEMENT/SOCIAL WORK - PATIENT PORTAL LINK FT
You can access the FollowMyHealth Patient Portal offered by [facility] by registering at the following website: [email or web address]. By joining [employer]’s FollowMyHealth portal, you will also be able to view your health information using other applications (apps) compatible with our system.

## 2024-03-01 NOTE — PROGRESS NOTE ADULT - ASSESSMENT
Assessment/Plan: 59y female POD#1 from RA-RAYMON, BSO, SCC, MUS, Cysto clinically stable and meeting postoperative milestones.    Neuro: Continue Tylenol, Motrin for pain control.  CV: Hemodynamically stable  Pulm: Saturating well on room air. Encourage ambulation.   GI: Continue regular diet. Zofran PRN  : Asher in place. Adequate UOP. TOV this AM  Heme: Continue Venodynes for DVT ppx. Increase OOB.    FEN: LR@75  ID: Afebrile  Endo: No active issues  Dispo: Continue routine postoperative care    Lesley Rubio MD, PGY-1   Assessment/Plan: 59y female POD#1 from RA-RAYMON, BSO, SCC, MUS, Cysto clinically stable and meeting postoperative milestones.    Neuro: Continue Tylenol, Motrin for pain control.  CV: Hemodynamically stable  Pulm: Saturating well on room air. Encourage ambulation.   GI: Continue regular diet. Zofran PRN  : Asher in place. Adequate UOP. TOV this AM  Heme: Continue Venodynes for DVT ppx. Increase OOB.    FEN: LR@75  ID: Afebrile  Endo: No active issues  Dispo: Continue routine postoperative care    Melina Gutierrez [facility]   Assessment/Plan: 59y female POD#1 from RA-RAYMON, BSO, SCC, MUS, Cysto clinically stable and meeting postoperative milestones.    Neuro: Continue Tylenol, Motrin for pain control.  CV: Hemodynamically stable  Pulm: Saturating well on room air. Encourage ambulation.   GI: Continue regular diet. Zofran PRN  : Lang in place. Adequate UOP. TOV this AM  Heme: Continue Venodynes for DVT ppx. Increase OOB.    FEN: LR@75  ID: Afebrile  Endo: No active issues  Dispo: Continue routine postoperative care    Melina Gutierrez [facility]    ----------  Urogyn Fellow Addendum    Patient seen and examined.  Tolerating PO intake.  Not yet oob.  Pain well controlled.  No nausea or vomiting.    T(C): 36.2 (03-01-24 @ 07:00), Max: 36.6 (02-29-24 @ 11:24)  HR: 82 (03-01-24 @ 07:00) (68 - 96)  BP: 122/65 (03-01-24 @ 07:00) (104/64 - 145/60)  RR: 16 (03-01-24 @ 07:00) (16 - 18)  SpO2: 100% (03-01-24 @ 07:00) (94% - 100%)    Physical Exam  Gen: NAD  HEENT: NCAT, sclera anicteric  Resp: non-labored  Abd: soft, non-distended, appropriately tender; incisions clean, dry, intact  : lang draining clear urine; vaginal packing ~20% saturated    59F POD#1 s/p RA RAYMON, SCP, BSO, RP midurethral sling, cystoscopy, doing well.   -continue pain control  -regular diet  -dc IV fluids  -oob/ambulation  -dvt prophylaxis  -void trial this am  -dispo: home    Kathi Gill MD  Urogynecology Fellow, PGY-7

## 2024-03-01 NOTE — DISCHARGE NOTE PROVIDER - NSDCCPTREATMENT_GEN_ALL_CORE_FT
PRINCIPAL PROCEDURE  Procedure: Hysterectomy, supracervical, laparoscopic, with BSO and cystoscopy  Findings and Treatment:       SECONDARY PROCEDURE  Procedure: Bilateral salpingoophorectomy  Findings and Treatment:     Procedure: Robot-assisted sacrocolpopexy  Findings and Treatment:

## 2024-03-01 NOTE — DISCHARGE NOTE NURSING/CASE MANAGEMENT/SOCIAL WORK - NSDCPEFALRISK_GEN_ALL_CORE
For information on Fall & Injury Prevention, visit: [email or web address] OR  [email or web address] OR  https://www.cdc.gov/steadi/patient.html

## 2024-03-01 NOTE — DISCHARGE NOTE PROVIDER - COLLABORATE WITH
Implemented All Universal Safety Interventions:  [place] to call system. Call bell, personal items and telephone within reach. Instruct patient to call for assistance. Room bathroom lighting operational. Non-slip footwear when patient is off stretcher. Physically safe environment: no spills, clutter or unnecessary equipment. Stretcher in lowest position, wheels locked, appropriate side rails in place.
Fellow

## 2024-03-01 NOTE — DISCHARGE NOTE PROVIDER - NSDCMRMEDTOKEN_GEN_ALL_CORE_FT
acetaminophen 325 mg oral tablet: 3 tab(s) orally every 6 hours As needed Mild Pain (1 - 3)  ibuprofen 600 mg oral tablet: 1 tab(s) orally every 6 hours As needed Mild Pain (1 - 3)  loratadine 10 mg oral capsule: 1 cap(s) orally once a day as needed for  allergy symptoms  polyethylene glycol 3350 oral powder for reconstitution: 17 gram(s) orally once a day

## 2024-03-01 NOTE — DISCHARGE NOTE PROVIDER - NSDCCPCAREPLAN_GEN_ALL_CORE_FT
PRINCIPAL DISCHARGE DIAGNOSIS  Diagnosis: Incomplete uterovaginal prolapse  Assessment and Plan of Treatment:

## 2024-03-01 NOTE — CHART NOTE - NSCHARTNOTEFT_GEN_A_CORE
POST-OP CHECK NOTE @ 1914    SUBJECTIVE:    58yo F now POD0 s/p RA-RAYMON, BSO, SCC, MUS, and cysto. Will given analgesics. Not yet OOB. Lang catheter in place w/ 50-60cc of UOP/hr. Not yet taken in PO. She denies fever, chills, nausea, vomiting, headache, dizziness, chest pain, palpitations, shortness of breath.    acetaminophen     Tablet .. 975 milliGRAM(s) Oral every 6 hours PRN  cefoTEtan  IVPB 2 Gram(s) IV Intermittent once  HYDROmorphone  Injectable 0.5 milliGRAM(s) IV Push every 10 minutes PRN  ibuprofen  Tablet. 600 milliGRAM(s) Oral every 6 hours PRN  lactated ringers. 1000 milliLiter(s) IV Continuous <Continuous>  ondansetron Injectable 4 milliGRAM(s) IV Push once PRN      OBJECTIVE:    VITAL SIGNS:  Vital Signs Last 24 Hrs  T(C): 36.1 (29 Feb 2024 17:25), Max: 36.6 (29 Feb 2024 11:24)  T(F): 97 (29 Feb 2024 17:25), Max: 97.9 (29 Feb 2024 11:24)  HR: 79 (29 Feb 2024 18:15) (69 - 93)  BP: 125/72 (29 Feb 2024 18:15) (119/71 - 141/83)  BP(mean): 94 (29 Feb 2024 18:15) (89 - 107)  RR: 17 (29 Feb 2024 18:15) (16 - 18)  SpO2: 100% (29 Feb 2024 18:15) (99% - 100%)    Parameters below as of 29 Feb 2024 17:40  Patient On (Oxygen Delivery Method): nasal cannula  O2 Flow (L/min): 2    CAPILLARY BLOOD GLUCOSE      POCT Blood Glucose.: 91 mg/dL (29 Feb 2024 10:38)      02-29-24 @ 07:01  -  02-29-24 @ 19:23  --------------------------------------------------------  IN: 150 mL / OUT: 20 mL / NET: 130 mL        PHYSICAL EXAM:  GEN: No acute distress. Awake. Alert   CV: Regular rate and rhythm on bedside monitor   LUNGS: Unlabored breathing. No respiratory distress  ABD: Soft. Appropriately tender diffusely. Mildly distended.   Incision: Lsc sites CDI, dressings in place  : Clear yellow urine in lang bag   EXT: No calf tenderness bilaterally    LABS:            ASSESSMENT:  58yo F now POD0 s/p RA-RAYMON, BSO, SCC, MUS, and cysto. Patient is hemodynamically stable and is overall progressing appropriately post-op. However, patient desires to stay overnight given concerns of insufficient care at home     NEURO: Pain controlled on current regimen  CV: Hemodynamically stable. No AM labs   PULM: Saturating well on RA  GI: Advance diet as tolerated; Zofran prn  : Continue lang until AM for ToV  HEME: SCDs, early ambulation  ID: Afebrile  Dispo: For the floor     John Paul Gar, PGY-2  Obstetrics and Gynecology    d/w Estefany Gill and Yasmin
Patient seen and evaluated for TOV. Asher drainage bag disconnected with 150cc in chamber. Bladder backfilled with 300cc normal saline. Patient ambulated to bathroom with minimal assist. Voided 250cc without incidence. Karin care provided and encouraged. Patient ambulated back to bed with minimal assist. To be discharged home.    MD Perez made aware  Melina Gutierrez [facility]-BC

## 2024-03-01 NOTE — DISCHARGE NOTE PROVIDER - NSDCFUSCHEDAPPT_GEN_ALL_CORE_FT
[facility]  UROGYN [address]  Scheduled Appointment: 03/12/2024

## 2024-03-01 NOTE — PROGRESS NOTE ADULT - ATTENDING COMMENTS
Pt s/p RA-RAYMON/BSO, SCPX, MUS, and cystoscopy. Doing well on POD1. Reports adequate pain control with medications. Denies nausea, vomiting, chest pain, lightheadedness, or dizziness. Tolerate regular diet. Denies passage of flatus.     Gen: Sitting in chair, NAD, AVSS  Resp: Non-labored, speaking in full sentences  Abd: Soft, non-distended, appropriately tender; incisions clean, dry, intact, mild periumbilical ecchymosis noted.   : Asher draining clear urine, scant bleeding    Plan: Pt awaiting TOV. Ambulate. Anticipate D/C home after TOV. Postoperative precautions reviewed.

## 2024-03-01 NOTE — DISCHARGE NOTE PROVIDER - NSDCFUADDINST_GEN_ALL_CORE_FT
POSTOPERATIVE INSTRUCTIONS    Bowel regimen:  To avoid constipation and straining, we suggest the following (simultaneously):  1. Colace (stool softener) 100mg, one pill three times a day (breakfast, lunch, dinner). If stool is too soft, decrease to twice a day (breakfast, dinner)  If still constipated, you can add: Miralax once at bedtime  All of these agents can be obtained over the counter at the pharmacy.      Pain control:  For pain control, take the followin.  Motrin 800mg three times a day, take with food  2.  Add Tylenol as needed if still have pain despite Motrin  Motrin and Tylenol can be obtained over the counter.  3. Oxycodone 5mg every 6hrs as needed for severe pain       Postoperative urinary catheter:  If you failed your voiding trial in the hospital and are sent home with a catheter, please call the office ([phone]) so you can come in to have a repeat voiding trial/catheter removal in a few days.      Postoperative restrictions:  Nothing in the vagina (tampons, sexual intercourse), No tub baths, pools or hot tubs for 6 weeks (showers are ok!)  No lifting anything heavier than 10 lbs, no strenuous exercise for 2 weeks after surgery. Do not pull or cut any stitches that you see around your incision.      Vaginal bleeding:  Spotting and intermittent passage of blood clots per vagina is normal in first few weeks after surgery. If you are soaking 1 pad per hour, that is not normal and you should notify my office and seek medical attention right away.      Vaginal discharge:  Vaginal discharge (all colors) is normal after vaginal surgery. If you’ve had vaginal surgery, you have sutures in your vagina which take 3 months to fully absorb. You may have vaginal discharge during this time. This is normal.

## 2024-03-01 NOTE — DISCHARGE NOTE PROVIDER - HOSPITAL COURSE
Patient underwent scheduled RA-RAYMON, cysto, sacrocolpopexy, BSO, and MUS. . Please refer to operative note for further details. H/H as below. Patient’s postoperative course was unremarkable and she remained hemodynamically stable and afebrile throughout. Upon discharge on POD#1 the patient was ambulating, passed her trial of void, was tolerating oral intake, pain was well controlled with oral medication, and vital signs were stable. Patient was discharged home with plan to follow-up with Dr. Hoffman

## 2024-03-01 NOTE — PROGRESS NOTE ADULT - SUBJECTIVE AND OBJECTIVE BOX
Gyn Progress Note     SUBJECTIVE:   Pt seen and examined at bedside. No events overnight. Pain well controlled. Patient ambulating. Passing flatus. Tolerating regular diet. Pt denies fever, chills, chest pain, SOB, nausea, vomiting, lightheadedness, dizziness.      PHYSICAL EXAM:   T(F): 97.7 (03-01-24 @ 03:00), Max: 97.9 (02-29-24 @ 11:24)  HR: 73 (03-01-24 @ 06:00) (68 - 96)  BP: 131/70 (03-01-24 @ 06:00) (104/64 - 145/60)  RR: 16 (03-01-24 @ 06:00) (16 - 18)  SpO2: 100% (03-01-24 @ 06:00) (94% - 100%)  Wt(kg): --    Constitutional: NAD, A+O x3  CV: RRR  Lungs: Clear to auscultation bilaterally  Abdomen: Soft, nondistended, no guarding or rebound tenderness. Normal bowel sounds  Incision: Laparoscopic incision sites clean, dry, intact  : No bleeding on pad  Extremities: No lower extremity edema or calf tenderness bilaterally; venodynes in place    LABS:                CAPILLARY BLOOD GLUCOSE      POCT Blood Glucose.: 91 mg/dL (29 Feb 2024 10:38)      I&O's Summary    29 Feb 2024 07:01  -  01 Mar 2024 06:27  --------------------------------------------------------  IN: 1065 mL / OUT: 1130 mL / NET: -65 mL        MEDICATIONS  (STANDING):  cefoTEtan  IVPB 2 Gram(s) IV Intermittent once  lactated ringers. 1000 milliLiter(s) (75 mL/Hr) IV Continuous <Continuous>    MEDICATIONS  (PRN):  acetaminophen     Tablet .. 975 milliGRAM(s) Oral every 6 hours PRN Mild Pain (1 - 3)  HYDROmorphone  Injectable 0.5 milliGRAM(s) IV Push every 10 minutes PRN Moderate Pain (4 - 6)  ibuprofen  Tablet. 600 milliGRAM(s) Oral every 6 hours PRN Mild Pain (1 - 3)  ondansetron Injectable 4 milliGRAM(s) IV Push once PRN Nausea and/or Vomiting       Gyn Progress Note     SUBJECTIVE:   Pt seen and examined at bedside. No events overnight. Pain well controlled. Patient ambulating. Passing flatus. Tolerating regular diet. Pt denies fever, chills, chest pain, SOB, nausea, vomiting, lightheadedness, dizziness.      PHYSICAL EXAM:   T(F): 97.7 (03-01-24 @ 03:00), Max: 97.9 (02-29-24 @ 11:24)  HR: 73 (03-01-24 @ 06:00) (68 - 96)  BP: 131/70 (03-01-24 @ 06:00) (104/64 - 145/60)  RR: 16 (03-01-24 @ 06:00) (16 - 18)  SpO2: 100% (03-01-24 @ 06:00) (94% - 100%)    Constitutional: NAD, A+O x3  CV: RRR  Abdomen: Soft, nondistended, no guarding or rebound tenderness.  Incision: Laparoscopic incision sites clean, dry, intact  : No bleeding on pad, packing removed; Asher catheter in place  Extremities: No lower extremity edema or calf tenderness bilaterally; venodynes in place    POCT Blood Glucose.: 91 mg/dL (29 Feb 2024 10:38)    I&O's Summary    29 Feb 2024 07:01  -  01 Mar 2024 06:27  --------------------------------------------------------  IN: 1065 mL / OUT: 1130 mL / NET: -65 mL    MEDICATIONS  (STANDING):  cefoTEtan  IVPB 2 Gram(s) IV Intermittent once  lactated ringers. 1000 milliLiter(s) (75 mL/Hr) IV Continuous <Continuous>    MEDICATIONS  (PRN):  acetaminophen     Tablet .. 975 milliGRAM(s) Oral every 6 hours PRN Mild Pain (1 - 3)  HYDROmorphone  Injectable 0.5 milliGRAM(s) IV Push every 10 minutes PRN Moderate Pain (4 - 6)  ibuprofen  Tablet. 600 milliGRAM(s) Oral every 6 hours PRN Mild Pain (1 - 3)  ondansetron Injectable 4 milliGRAM(s) IV Push once PRN Nausea and/or Vomiting       Gyn Progress Note     SUBJECTIVE:   Pt seen and examined at bedside. No events overnight. Pain well controlled. Patient ambulating. Tolerating regular diet. Pt denies fever, chills, chest pain, SOB, nausea, vomiting, lightheadedness, dizziness.      PHYSICAL EXAM:   T(F): 97.7 (03-01-24 @ 03:00), Max: 97.9 (02-29-24 @ 11:24)  HR: 73 (03-01-24 @ 06:00) (68 - 96)  BP: 131/70 (03-01-24 @ 06:00) (104/64 - 145/60)  RR: 16 (03-01-24 @ 06:00) (16 - 18)  SpO2: 100% (03-01-24 @ 06:00) (94% - 100%)    Constitutional: NAD, A+O x3  CV: RRR  Abdomen: Soft, nondistended, no guarding or rebound tenderness.  Incision: Laparoscopic incision sites clean, dry, intact  : No bleeding on pad, packing removed; Asher catheter in place  Extremities: No lower extremity edema or calf tenderness bilaterally; venodynes in place    POCT Blood Glucose.: 91 mg/dL (29 Feb 2024 10:38)    I&O's Summary    29 Feb 2024 07:01  -  01 Mar 2024 06:27  --------------------------------------------------------  IN: 1065 mL / OUT: 1130 mL / NET: -65 mL    MEDICATIONS  (STANDING):  cefoTEtan  IVPB 2 Gram(s) IV Intermittent once  lactated ringers. 1000 milliLiter(s) (75 mL/Hr) IV Continuous <Continuous>    MEDICATIONS  (PRN):  acetaminophen     Tablet .. 975 milliGRAM(s) Oral every 6 hours PRN Mild Pain (1 - 3)  HYDROmorphone  Injectable 0.5 milliGRAM(s) IV Push every 10 minutes PRN Moderate Pain (4 - 6)  ibuprofen  Tablet. 600 milliGRAM(s) Oral every 6 hours PRN Mild Pain (1 - 3)  ondansetron Injectable 4 milliGRAM(s) IV Push once PRN Nausea and/or Vomiting

## 2024-03-05 PROCEDURE — 82962 GLUCOSE BLOOD TEST: CPT

## 2024-03-05 PROCEDURE — 57425 LAPAROSCOPY SURG COLPOPEXY: CPT

## 2024-03-05 PROCEDURE — S2900: CPT

## 2024-03-05 PROCEDURE — 88305 TISSUE EXAM BY PATHOLOGIST: CPT

## 2024-03-05 PROCEDURE — 88307 TISSUE EXAM BY PATHOLOGIST: CPT

## 2024-03-05 PROCEDURE — C9399: CPT

## 2024-03-05 PROCEDURE — 58542 LSH W/T/O UT 250 G OR LESS: CPT

## 2024-03-05 PROCEDURE — C1889: CPT

## 2024-03-05 PROCEDURE — C1781: CPT

## 2024-03-05 PROCEDURE — C1771: CPT

## 2024-03-05 PROCEDURE — 51992 LAPARO SLING OPERATION: CPT

## 2024-03-07 LAB — SURGICAL PATHOLOGY STUDY: SIGNIFICANT CHANGE UP

## 2024-03-12 ENCOUNTER — APPOINTMENT (OUTPATIENT)
Dept: UROGYNECOLOGY | Facility: CLINIC | Age: 59
End: 2024-03-12
Payer: MEDICAID

## 2024-03-12 VITALS
HEIGHT: 61 IN | WEIGHT: 136 LBS | HEART RATE: 77 BPM | DIASTOLIC BLOOD PRESSURE: 88 MMHG | SYSTOLIC BLOOD PRESSURE: 135 MMHG | BODY MASS INDEX: 25.68 KG/M2

## 2024-03-12 DIAGNOSIS — N89.8 OTHER SPECIFIED NONINFLAMMATORY DISORDERS OF VAGINA: ICD-10-CM

## 2024-03-12 PROBLEM — Z91.09 OTHER ALLERGY STATUS, OTHER THAN TO DRUGS AND BIOLOGICAL SUBSTANCES: Chronic | Status: ACTIVE | Noted: 2024-02-13

## 2024-03-12 PROBLEM — K63.5 POLYP OF COLON: Chronic | Status: ACTIVE | Noted: 2024-02-13

## 2024-03-12 PROBLEM — K80.50 CALCULUS OF BILE DUCT WITHOUT CHOLANGITIS OR CHOLECYSTITIS WITHOUT OBSTRUCTION: Chronic | Status: ACTIVE | Noted: 2024-02-13

## 2024-03-12 PROBLEM — Z87.19 PERSONAL HISTORY OF OTHER DISEASES OF THE DIGESTIVE SYSTEM: Chronic | Status: ACTIVE | Noted: 2024-02-13

## 2024-03-12 PROCEDURE — 99024 POSTOP FOLLOW-UP VISIT: CPT

## 2024-03-12 NOTE — DISCUSSION/SUMMARY
[FreeTextEntry1] : Pt s/p RA-RAYMON/BSO, SCPX, MUS, cystoscopy on 2/29. Overall doing well post-op. Reporting vaginal odor, likely from sutures healing. Exam unremarkable. AFFIRM collected to r/o vaginitis.  Pt ambulating, eating and drinking well without any issues, no fevers/chills or n/v. Some bloating but having regular bowel movement with occasional straining. Recommend MiraLAX every other day as needed.  Post-op precautions reviewed again. Aware to call the office with any questions or concerns. RTO in 4 weeks.

## 2024-03-12 NOTE — SUBJECTIVE
[FreeTextEntry1] : Overall doing well. [FreeTextEntry8] : MiraLAX PRN. [FreeTextEntry7] : Overall, well controlled, some suprapubic pain/discomfort. [FreeTextEntry6] : Good. [FreeTextEntry4] : Has bowel movements daily, occasionally has to strain. Stopped using MiraLAX daily due to diarrhea.  [FreeTextEntry5] : Normal, denies any issues with emptying. [FreeTextEntry3] : Normal. [FreeTextEntry9] : Reporting vaginal odor over the last few days. No discharge, burning, itching.

## 2024-03-12 NOTE — OBJECTIVE
[Voiding Trial] : No voiding trial was performed [Post Void Residual ____ ml] : Post Void Residual was [unfilled] ml [Soft and Nontender] : soft and nontender [Clean, Dry, Intact] : Clean, Dry, Intact [Good Support] : Good support [Healing well] : healing well [FreeTextEntry1] : Some discomfort in suprapubic region [FreeTextEntry3] : anterior vaginal incision sutures in place, mild discharge, non-foul smelling.

## 2024-03-13 ENCOUNTER — NON-APPOINTMENT (OUTPATIENT)
Age: 59
End: 2024-03-13

## 2024-03-13 LAB
CANDIDA VAG CYTO: NOT DETECTED
G VAGINALIS+PREV SP MTYP VAG QL MICRO: NOT DETECTED
T VAGINALIS VAG QL WET PREP: NOT DETECTED

## 2024-04-09 ENCOUNTER — APPOINTMENT (OUTPATIENT)
Dept: UROGYNECOLOGY | Facility: CLINIC | Age: 59
End: 2024-04-09
Payer: MEDICAID

## 2024-04-09 VITALS
WEIGHT: 135 LBS | DIASTOLIC BLOOD PRESSURE: 85 MMHG | SYSTOLIC BLOOD PRESSURE: 128 MMHG | BODY MASS INDEX: 25.49 KG/M2 | HEIGHT: 61 IN | HEART RATE: 78 BPM

## 2024-04-09 PROCEDURE — 99024 POSTOP FOLLOW-UP VISIT: CPT

## 2024-04-09 NOTE — OBJECTIVE
[Soft and Nontender] : soft and nontender [Clean, Dry, Intact] : Clean, Dry, Intact [Good Support] : Good support [Healing well] : healing well [FreeTextEntry1] : Some discomfort in suprapubic region [FreeTextEntry3] : no sutures, no mesh erosion, no tenderness

## 2024-04-09 NOTE — DISCUSSION/SUMMARY
[FreeTextEntry1] : Pt s/p RA-RAYMON/BSO, SCPX, MUS, cystoscopy on 2/29. Overall doing well post-op.  Some bloating but having regular bowel movement with straining/constipation.  Recommend avoidance foods that produce significant amount to gas, raw vegetable.  Recommend Colace and MiraLAX daily, can adjust to every other day as needed depending on stool consistency.  Advised trial of warm compresses and pain management for groin cramping with exertion. Will continue to monitor. RTO in 4 weeks. Aware to call office with any questions or concerns. Return earlier with any worsening symptoms.

## 2024-04-09 NOTE — SUBJECTIVE
[FreeTextEntry1] : Overall doing well. [FreeTextEntry8] : MiraLAX PRN. [FreeTextEntry7] : Reports some groin and suprapubic cramping at time with activity, comes and goes. [FreeTextEntry6] : Normal, denies any concerns, no nausea/vomiting. [FreeTextEntry5] : Normal, denies any issues with emptying. Voids 2-3 times daily and has urgency at times. [FreeTextEntry4] : Admits to having constipation and straining. Using MiraLAX occasionally.  [FreeTextEntry3] : Normal.

## 2024-05-07 ENCOUNTER — APPOINTMENT (OUTPATIENT)
Dept: UROGYNECOLOGY | Facility: CLINIC | Age: 59
End: 2024-05-07
Payer: MEDICAID

## 2024-05-07 VITALS
SYSTOLIC BLOOD PRESSURE: 125 MMHG | BODY MASS INDEX: 25.49 KG/M2 | WEIGHT: 135 LBS | DIASTOLIC BLOOD PRESSURE: 80 MMHG | HEART RATE: 73 BPM | HEIGHT: 61 IN

## 2024-05-07 DIAGNOSIS — Z98.890 OTHER SPECIFIED POSTPROCEDURAL STATES: ICD-10-CM

## 2024-05-07 PROCEDURE — 99024 POSTOP FOLLOW-UP VISIT: CPT

## 2024-05-07 NOTE — DISCUSSION/SUMMARY
[FreeTextEntry1] : Carola presents for follow-up. She is doing well s/p RA-RAYMON/BSO, SCPX, MUS. At 6-week postop she was reporting some issues with bloating and constipation. We reviewed bowel regimen and today she reports doing better. She has daily bowel movements, occasionally has to strain, less bloating. Abdomen soft, NT. Pt is happy with surgical results. No bulge and no urinary complaints. She is cleared from surgical perspective to return to all normal activities. Advised to follow-up if any issues should arise.

## 2024-05-14 NOTE — PATIENT PROFILE ADULT - FALL HARM RISK - UNIVERSAL INTERVENTIONS
Julio C Martin  [address] [place] [address]        05/14/24      Dear Julio C,      Your health care provider wrote a referral for you to see the Gastroenterology Department. Our department has not been able to reach you by telephone.    Your care is important to us.  Please call [phone] to schedule your appointment.    Sincerely,        [facility]  [facility]  [address] [facility] [address]  [facility] [address]  Dept Phone: [phone]      
Bed in lowest position, wheels locked, appropriate side rails in place/Call bell, personal items and telephone in reach/Instruct patient to call for assistance before getting out of bed or chair/Non-slip footwear when patient is out of bed/[place] to call system/Physically safe environment - no spills, clutter or unnecessary equipment/Purposeful Proactive Rounding/Room/bathroom lighting operational, light cord in reach

## 2024-10-31 ENCOUNTER — APPOINTMENT (OUTPATIENT)
Dept: UROGYNECOLOGY | Facility: CLINIC | Age: 59
End: 2024-10-31

## (undated) DEVICE — SUT VLOC 180 2-0 6" GS-22 GREEN

## (undated) DEVICE — SYR ASEPTO

## (undated) DEVICE — UTERINE MANIPULATOR CONMED VCARE LG 37MM

## (undated) DEVICE — XI ARM FORCEP PROGRASP 8MM

## (undated) DEVICE — TUBING STRYKEFLOW II SUCTION / IRRIGATOR

## (undated) DEVICE — XI TIP COVER

## (undated) DEVICE — DRAPE 1/2 SHEET 40X57"

## (undated) DEVICE — LAP PAD 4 X 18"

## (undated) DEVICE — VISITEC 4X4

## (undated) DEVICE — SPECIMEN CONTAINER 100ML

## (undated) DEVICE — BLADE SCALPEL SAFETYLOCK #11

## (undated) DEVICE — DRAPE 3/4 SHEET W REINFORCEMENT 56X77"

## (undated) DEVICE — TUBING TUR 2 PRONG

## (undated) DEVICE — SOL INJ NS 0.9% 100ML

## (undated) DEVICE — XI ARM NEEDLE DRIVER MEGA

## (undated) DEVICE — PREP BETADINE SPONGE STICKS

## (undated) DEVICE — LAP PAD 18 X 18"

## (undated) DEVICE — MARKING PEN W RULER

## (undated) DEVICE — UTERINE MANIPULATOR CONMED VCARE SM 32MM

## (undated) DEVICE — DRAPE MAYO STAND 30"

## (undated) DEVICE — D HELP - CLEARVIEW CLEARIFY SYSTEM

## (undated) DEVICE — PREP CHLORAPREP HI-LITE ORANGE 26ML

## (undated) DEVICE — STAPLER SKIN VISI-STAT 35 WIDE

## (undated) DEVICE — XI ARM CLIP APPLIER LARGE

## (undated) DEVICE — XI ARM NEEDLE DRIVER LARGE

## (undated) DEVICE — SOL INJ NS 0.9% 1000ML

## (undated) DEVICE — XI ARM SCISSOR MONO CURVED

## (undated) DEVICE — MEDICATION LABELS W MARKER

## (undated) DEVICE — TROCAR APPLIED MEDICAL KII BALLOON BLUNT TIP 12MM X 100MM

## (undated) DEVICE — NDL COUNTER FOAM AND MAGNET 40-70

## (undated) DEVICE — DRAPE TOWEL BLUE 17" X 24"

## (undated) DEVICE — SOL IRR POUR H2O 250ML

## (undated) DEVICE — SUT POLYSORB 3-0 30" V-20 UNDYED

## (undated) DEVICE — TUBING AIRSEAL TRI-LUMEN FILTERED

## (undated) DEVICE — ENDOCATCH 10MM SPECIMEN POUCH

## (undated) DEVICE — XI ARM FORCEP FENESTRATED BIPOLAR 8MM

## (undated) DEVICE — LONE STAR RETRACTOR RING 32.5CM X 18.3CM DISP

## (undated) DEVICE — LUBRICANT INST ELECTROLUBE Z SOLUTION

## (undated) DEVICE — SUT POLYSORB 2-0 30" V-20 UNDYED

## (undated) DEVICE — XI ARM FORCEP TENACULUM

## (undated) DEVICE — SOL IRR POUR NS 0.9% 500ML

## (undated) DEVICE — XI DRAPE ARM

## (undated) DEVICE — PACK GYN LAPAROSCOPY

## (undated) DEVICE — XI SEAL UNIV 5- 8 MM

## (undated) DEVICE — PREP BETADINE KIT

## (undated) DEVICE — LONE STAR ELASTIC STAY HOOK 5MM SHARP

## (undated) DEVICE — GLV 7 PROTEXIS (WHITE)

## (undated) DEVICE — SYR LUER LOK 20CC

## (undated) DEVICE — XI ARM NEEDLE DRIVER SUTURECUT MEGA 8MM

## (undated) DEVICE — GLV 7.5 PROTEXIS (WHITE)

## (undated) DEVICE — GLV 8.5 PROTEXIS (WHITE)

## (undated) DEVICE — DRSG STERISTRIPS 0.5 X 4"

## (undated) DEVICE — WARMING BLANKET UPPER ADULT

## (undated) DEVICE — SHEARS COVIDIEN ENDO SHEAR 5MM X 31CM W UNIPOLAR CAUTERY

## (undated) DEVICE — SUT CLIP LAPRA-TY ABSORBABLE SIZE 0.118 TO 0.12" VIOLET

## (undated) DEVICE — FOLEY CATH 2-WAY 18FR 5CC LATEX HYDROGEL

## (undated) DEVICE — TROCAR SURGIQUEST AIRSEAL 8MMX100MM

## (undated) DEVICE — GLV 6.5 PROTEXIS (WHITE)

## (undated) DEVICE — DRSG DERMABOND 0.7ML

## (undated) DEVICE — SUT BIOSYN 2-0 27" V-20

## (undated) DEVICE — DRSG OPSITE 2.5 X 2"

## (undated) DEVICE — VENODYNE/SCD SLEEVE CALF LARGE

## (undated) DEVICE — SUT POLYSORB 0 30" GS-21 UNDYED

## (undated) DEVICE — INSUFFLATION NDL COVIDIEN STEP 14G FOR STEP/VERSASTEP

## (undated) DEVICE — NDL HYPO SAFE 22G X 1.5" (BLACK)

## (undated) DEVICE — GOWN TRIMAX LG

## (undated) DEVICE — SUT BIOSYN 4-0 18" P-12

## (undated) DEVICE — FOLEY TRAY 16FR LF URINE METER SURESTEP

## (undated) DEVICE — POSITIONER FOAM EGG CRATE ULNAR 2PCS (PINK)

## (undated) DEVICE — SUT GORETEX CV-2 (0) 36" PH-24

## (undated) DEVICE — XI DRAPE COLUMN

## (undated) DEVICE — SUT POLYSORB 0 30" GS-23

## (undated) DEVICE — UTERINE MANIPULATOR CONMED VCARE MED 34MM

## (undated) DEVICE — XI OBTURATOR OPTICAL BLADELESS 8MM

## (undated) DEVICE — SUCTION YANKAUER NO CONTROL VENT

## (undated) DEVICE — GLV 8 PROTEXIS (WHITE)

## (undated) DEVICE — ELCTR BOVIE PENCIL SMOKE EVACUATION

## (undated) DEVICE — XI ARM FORCEP MARYLAND BIPOLAR

## (undated) DEVICE — POSITIONER PURPLE ARM ONE STEP (LARGE)

## (undated) DEVICE — POSITIONER PINK PAD PIGAZZI SYSTEM

## (undated) DEVICE — XI ARM DISSECTOR CURVED BIPOLAR 8MM

## (undated) DEVICE — XI ARM GRASPER TIP UP FENESTRATED

## (undated) DEVICE — PREP CHLOROHEXIDINE 4% 118CC KIT

## (undated) DEVICE — DRAPE GYN W LEGGINGS 3X125"

## (undated) DEVICE — TUBING SUCTION 20FT